# Patient Record
Sex: MALE | Race: WHITE | URBAN - METROPOLITAN AREA
[De-identification: names, ages, dates, MRNs, and addresses within clinical notes are randomized per-mention and may not be internally consistent; named-entity substitution may affect disease eponyms.]

---

## 2018-09-14 ENCOUNTER — APPOINTMENT (RX ONLY)
Dept: URBAN - METROPOLITAN AREA CLINIC 10 | Facility: CLINIC | Age: 28
Setting detail: DERMATOLOGY
End: 2018-09-14

## 2018-09-14 DIAGNOSIS — L91.8 OTHER HYPERTROPHIC DISORDERS OF THE SKIN: ICD-10-CM

## 2018-09-14 DIAGNOSIS — D485 NEOPLASM OF UNCERTAIN BEHAVIOR OF SKIN: ICD-10-CM

## 2018-09-14 DIAGNOSIS — Z71.89 OTHER SPECIFIED COUNSELING: ICD-10-CM

## 2018-09-14 DIAGNOSIS — D22 MELANOCYTIC NEVI: ICD-10-CM

## 2018-09-14 DIAGNOSIS — K64.4 RESIDUAL HEMORRHOIDAL SKIN TAGS: ICD-10-CM

## 2018-09-14 PROBLEM — D22.62 MELANOCYTIC NEVI OF LEFT UPPER LIMB, INCLUDING SHOULDER: Status: ACTIVE | Noted: 2018-09-14

## 2018-09-14 PROBLEM — D48.5 NEOPLASM OF UNCERTAIN BEHAVIOR OF SKIN: Status: ACTIVE | Noted: 2018-09-14

## 2018-09-14 PROCEDURE — ? BIOPSY BY SHAVE METHOD

## 2018-09-14 PROCEDURE — 11100: CPT

## 2018-09-14 PROCEDURE — 99202 OFFICE O/P NEW SF 15 MIN: CPT | Mod: 25

## 2018-09-14 PROCEDURE — ? PRESCRIPTION

## 2018-09-14 PROCEDURE — ? OBSERVATION AND MEASURE

## 2018-09-14 PROCEDURE — ? COUNSELING

## 2018-09-14 RX ORDER — LIDOCAINE HYDROCHLORIDE AND HYDROCORTISONE ACETATE 30; 25 MG/G; MG/G
GEL RECTAL
Qty: 1 | Refills: 3 | Status: ERX | COMMUNITY
Start: 2018-09-14

## 2018-09-14 RX ADMIN — LIDOCAINE HYDROCHLORIDE AND HYDROCORTISONE ACETATE: 30; 25 GEL RECTAL at 21:39

## 2018-09-14 ASSESSMENT — LOCATION ZONE DERM
LOCATION ZONE: ARM
LOCATION ZONE: ANUS
LOCATION ZONE: FACE

## 2018-09-14 ASSESSMENT — LOCATION SIMPLE DESCRIPTION DERM
LOCATION SIMPLE: RIGHT LATERAL PERIANAL SKIN
LOCATION SIMPLE: LEFT UPPER ARM
LOCATION SIMPLE: LEFT POSTERIOR PERIANAL SKIN
LOCATION SIMPLE: POSTERIOR PERIANAL SKIN (MIDLINE)
LOCATION SIMPLE: ANTERIOR PERIANAL SKIN (MIDLINE)
LOCATION SIMPLE: LEFT CHEEK

## 2018-09-14 ASSESSMENT — LOCATION DETAILED DESCRIPTION DERM
LOCATION DETAILED: LEFT ANTERIOR PROXIMAL UPPER ARM
LOCATION DETAILED: LEFT INFERIOR MEDIAL MALAR CHEEK
LOCATION DETAILED: LEFT POSTERIOR PERIANAL SKIN
LOCATION DETAILED: RIGHT LATERAL PERIANAL SKIN
LOCATION DETAILED: ANTERIOR PERIANAL SKIN (MIDLINE)
LOCATION DETAILED: POSTERIOR PERIANAL SKIN (MIDLINE)
LOCATION DETAILED: LEFT ANTERIOR DISTAL UPPER ARM

## 2018-09-14 NOTE — HPI: SECONDARY COMPLAINT
How Severe Is This Condition?: moderate
Additional History: The creams help the itching for a little bit but then it returns. He states no one in his household has a similar rash. He has tried switching soaps but he states some soaps make the rash worse. He is sexually active with his wife for many years. He has had 2 sexual partners in his lifetime and his wife has had 3 sexual partners in her lifetime. He is unaware of any STDs or genital warts that he or any of his partners have had. His wife has no symptoms of genital warts.

## 2018-09-14 NOTE — PROCEDURE: BIOPSY BY SHAVE METHOD
Anesthesia Type: 1% lidocaine with epinephrine
Anesthesia Volume In Cc: 0.5
X Size Of Lesion In Cm: 0
Detail Level: Detailed
Bill For Surgical Tray: no
Was A Bandage Applied: Yes
Consent: Written and verbal consent was obtained and risks were reviewed including but not limited to scarring, infection, bleeding, scabbing, incomplete removal and allergy to anesthesia and/or adhesives.
Cryotherapy Text: The wound bed was treated with cryotherapy after the biopsy was performed.
Depth Of Biopsy: dermis
Wound Care: Petrolatum
Dressing: Band-Aid
Lab Facility: [id number]
Notification Instructions: Patient will be notified of biopsy results requiring further treatment. Benign results will be posted via patient portal. However, patient instructed to call the office if not contacted within 1 week.
Path Notes (To The Dermatopathologist): Please check margins
Type Of Destruction Used: Curettage
Hemostasis: Drysol
Lab: [id number]
Curettage Text: The wound bed was treated with curettage after the biopsy was performed.
Silver Nitrate Text: The wound bed was treated with silver nitrate after the biopsy was performed.
Billing Type: Third-Party Bill
Biopsy Method: Personna blade
Post-Care Instructions: I reviewed with the patient post-care instructions. Patient is to keep the biopsy site dry overnight and apply vaseline daily until healed.
Biopsy Type: H and E
Electrodesiccation And Curettage Text: The wound bed was treated with electrodesiccation and curettage after the biopsy was performed.
Electrodesiccation Text: The wound bed was treated with electrodesiccation after the biopsy was performed.